# Patient Record
Sex: FEMALE | Race: WHITE | NOT HISPANIC OR LATINO
[De-identification: names, ages, dates, MRNs, and addresses within clinical notes are randomized per-mention and may not be internally consistent; named-entity substitution may affect disease eponyms.]

---

## 2024-07-30 ENCOUNTER — APPOINTMENT (OUTPATIENT)
Dept: ORTHOPEDIC SURGERY | Facility: CLINIC | Age: 34
End: 2024-07-30
Payer: COMMERCIAL

## 2024-07-30 DIAGNOSIS — Z78.9 OTHER SPECIFIED HEALTH STATUS: ICD-10-CM

## 2024-07-30 PROBLEM — Z00.00 ENCOUNTER FOR PREVENTIVE HEALTH EXAMINATION: Status: ACTIVE | Noted: 2024-07-30

## 2024-07-30 PROCEDURE — 99204 OFFICE O/P NEW MOD 45 MIN: CPT

## 2024-07-30 NOTE — ASSESSMENT
[FreeTextEntry1] : The patient was advised of the diagnosis. The natural history of the pathology was explained in full to the patient in layman's terms. All questions were answered. The risks and benefits of surgical and non-surgical treatment alternatives were explained in full to the patient.  - Pt is currently breastfeeding, and would prefer to wait to hear from her doctor if they approved of CSI while breastfeeding.  - She will follow up for trigger finger injection if doctor approves

## 2024-07-30 NOTE — HISTORY OF PRESENT ILLNESS
[de-identified] : 7/30/24: Pt is a 32 yo female, who presents with right thumb trigger finger x months. She states she developed trigger fingers in all her fingers while she was pregnant, which all resolved, except the right thumb. C/o clicking/locking. Of note, reports fall on right hand on ice around 10 yrs ago, which resolved without treatment.

## 2024-10-01 ENCOUNTER — APPOINTMENT (OUTPATIENT)
Dept: ORTHOPEDIC SURGERY | Facility: CLINIC | Age: 34
End: 2024-10-01
Payer: COMMERCIAL

## 2024-10-01 DIAGNOSIS — M65.311 TRIGGER THUMB, RIGHT THUMB: ICD-10-CM

## 2024-10-01 PROCEDURE — 20550 NJX 1 TENDON SHEATH/LIGAMENT: CPT | Mod: F5

## 2024-10-01 PROCEDURE — 99213 OFFICE O/P EST LOW 20 MIN: CPT | Mod: 25

## 2024-10-01 NOTE — HISTORY OF PRESENT ILLNESS
[de-identified] : 10/1/24: Pt reports that the pain in the right thumb has returned. Pt is currently breastfeeding.   7/30/24: Pt is a 34 yo female, who presents with right thumb trigger finger x months. She states she developed trigger fingers in all her fingers while she was pregnant, which all resolved, except the right thumb. C/o clicking/locking. Of note, reports fall on right hand on ice around 10 yrs ago, which resolved without treatment.  [FreeTextEntry5] : worsening pain since last visit.

## 2024-10-01 NOTE — ASSESSMENT
[FreeTextEntry1] : The patient was advised of the diagnosis. The natural history of the pathology was explained in full to the patient in layman's terms. All questions were answered. The risks and benefits of surgical and non-surgical treatment alternatives were explained in full to the patient.  We reviewed the anatomy of the flexor sheath and pathology of trigger fingers with the use of drawings and discussion.  We discussed the treatment options including splinting/nsaids, injection and surgery.  We discussed that too many injections may lead to weakening of the tendon/tendon rupture and the safety of two injections. After a discussion of the risks, benefits and alternatives along with the expectations, the patient was amenable to injection.  The indication for injection is pain and inflammation.  The skin overlying the tendon sheath/A1 pulley site was prepared with alcohol and ethyl chloride was sprayed topically.  Sterile technique was used. An injection of 1ml of lidocaine and 6mg of betamethasone was used.  The patient was instructed to call if redness, pain or fever occur.  They may apply ice for 15 minutes every hour for the next 12-24 hours as tolerated.  The patient understands that it may take 2-5 days to see a noticeable difference.  Sterile Band-Aid was applied.  CSI #1 was given in the right thumb  RTO 4 weeks